# Patient Record
Sex: MALE | URBAN - METROPOLITAN AREA
[De-identification: names, ages, dates, MRNs, and addresses within clinical notes are randomized per-mention and may not be internally consistent; named-entity substitution may affect disease eponyms.]

---

## 2018-06-16 ENCOUNTER — NURSE TRIAGE (OUTPATIENT)
Dept: NURSING | Facility: CLINIC | Age: 63
End: 2018-06-16

## 2018-06-16 NOTE — TELEPHONE ENCOUNTER
Daughter calling to figure out a care plan for her dad. He is on Xanax which lasts 2-4 hours, otherwise has anxiety. Hasn't been sleeping well for 2.5 months. His wife thinks he needs to be seen. Daughter is picking them up from the airport today. Family MD told them he'd need to be admitted.  Wants a plan in place. I advised he will need to be seen in the ER and tell the MD what his primary said, what meds he's on. From there the ER MD will determine next steps of care and whether or not they'll admit him for in patient care. She asked if they can go to any ER, I advised her they can.  Heather Valentine RN-[facility]